# Patient Record
(demographics unavailable — no encounter records)

---

## 2019-02-18 NOTE — CN
PATIENT NAME:JANES DO                                MEDICAL RECORD: [id number]
: 77                                              LOCATION:.ER       
ADMIT DATE:                                                ACCOUNT: [id number]
CONSULTING PHYSICIAN:    CAT MALIN MD             
                                               
REFERRING PHYSICIAN:     GLADYS ALBERT MD           
 
 
DATE OF CONSULTATION:  2019
 
CARDIOLOGY CONSULT
 
DIAGNOSES:
1.  Syncope.
2.  History of PVCs.
3.  Chest tightness.
 
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
by Dr. Carlo Gar at the [facility] here in [place].  She has
not seen him for a while.  She had a nuclear stress test within the past year
and this was normal.  She awoke with chest tightness and some palpitations today
and supposedly had an episode of syncope.  It is difficult to say if she was
totally out as she heard people yelling that you are passing out.  Head CT here
is negative.  Troponin is negative and EKG is normal with no ST-T changes.
 
OVERALL IMPRESSION:  Syncope or possible near syncope, unknown etiology, may
have been related to her dysrhythmia; even though, on telemetry here, she has
not had any significant dysrhythmia, not even PVCs.  With a normal nuclear
stress test within the past year and at her age, I do not feel any further
ischemic workup is necessary.  She will contact Dr. Gar.  He may want to do a
multiday monitor on her at this point.
 
TRANSINT:[id number] Voice Confirmation ID: [id number] DOCUMENT ID: [id number]
                                           
                                           CAT MALIN MD             
 
 
 
Electronically Signed by CAT MALIN on 19 at 1145
 
 
 
 
 
 
 
 
 
 
 
CC:                                                             [id number]
DICTATION DATE: 19 0859     :     19 1141      [facility]  
                                                                      [address]
[place] [address]